# Patient Record
Sex: FEMALE | Race: WHITE | NOT HISPANIC OR LATINO | Employment: FULL TIME | URBAN - METROPOLITAN AREA
[De-identification: names, ages, dates, MRNs, and addresses within clinical notes are randomized per-mention and may not be internally consistent; named-entity substitution may affect disease eponyms.]

---

## 2024-02-20 LAB
HBV SURFACE AG SERPL QL IA: NEGATIVE
HCV AB SERPL QL IA: NEGATIVE
HIV 1+2 AB+HIV1 P24 AG SERPL QL IA: NEGATIVE
RPR: NON REACTIVE
RUBELLA AB, IGG (OLG): 1.75 IU/ML
RUBELLA IMMUNE STATUS: NORMAL

## 2024-04-23 DIAGNOSIS — O30.042 DICHORIONIC DIAMNIOTIC TWIN PREGNANCY IN SECOND TRIMESTER: Primary | ICD-10-CM

## 2024-05-02 ENCOUNTER — PROCEDURE VISIT (OUTPATIENT)
Dept: MATERNAL FETAL MEDICINE | Facility: CLINIC | Age: 24
End: 2024-05-02
Payer: COMMERCIAL

## 2024-05-02 ENCOUNTER — OFFICE VISIT (OUTPATIENT)
Dept: MATERNAL FETAL MEDICINE | Facility: CLINIC | Age: 24
End: 2024-05-02
Payer: COMMERCIAL

## 2024-05-02 VITALS
BODY MASS INDEX: 26.6 KG/M2 | WEIGHT: 150.13 LBS | DIASTOLIC BLOOD PRESSURE: 75 MMHG | HEIGHT: 63 IN | HEART RATE: 103 BPM | SYSTOLIC BLOOD PRESSURE: 107 MMHG

## 2024-05-02 DIAGNOSIS — O43.192 MARGINAL INSERTION OF UMBILICAL CORD AFFECTING MANAGEMENT OF MOTHER IN SECOND TRIMESTER: ICD-10-CM

## 2024-05-02 DIAGNOSIS — O30.042 DICHORIONIC DIAMNIOTIC TWIN PREGNANCY IN SECOND TRIMESTER: ICD-10-CM

## 2024-05-02 DIAGNOSIS — O30.042 DICHORIONIC DIAMNIOTIC TWIN PREGNANCY IN SECOND TRIMESTER: Primary | ICD-10-CM

## 2024-05-02 PROCEDURE — 3078F DIAST BP <80 MM HG: CPT | Mod: CPTII,S$GLB,, | Performed by: OBSTETRICS & GYNECOLOGY

## 2024-05-02 PROCEDURE — 76817 TRANSVAGINAL US OBSTETRIC: CPT | Mod: S$GLB,,, | Performed by: OBSTETRICS & GYNECOLOGY

## 2024-05-02 PROCEDURE — 3008F BODY MASS INDEX DOCD: CPT | Mod: CPTII,S$GLB,, | Performed by: OBSTETRICS & GYNECOLOGY

## 2024-05-02 PROCEDURE — 1160F RVW MEDS BY RX/DR IN RCRD: CPT | Mod: CPTII,S$GLB,, | Performed by: OBSTETRICS & GYNECOLOGY

## 2024-05-02 PROCEDURE — 99204 OFFICE O/P NEW MOD 45 MIN: CPT | Mod: S$GLB,,, | Performed by: OBSTETRICS & GYNECOLOGY

## 2024-05-02 PROCEDURE — 76811 OB US DETAILED SNGL FETUS: CPT | Mod: S$GLB,,, | Performed by: OBSTETRICS & GYNECOLOGY

## 2024-05-02 PROCEDURE — 1159F MED LIST DOCD IN RCRD: CPT | Mod: CPTII,S$GLB,, | Performed by: OBSTETRICS & GYNECOLOGY

## 2024-05-02 PROCEDURE — 76812 OB US DETAILED ADDL FETUS: CPT | Mod: S$GLB,,, | Performed by: OBSTETRICS & GYNECOLOGY

## 2024-05-02 PROCEDURE — 3074F SYST BP LT 130 MM HG: CPT | Mod: CPTII,S$GLB,, | Performed by: OBSTETRICS & GYNECOLOGY

## 2024-05-02 RX ORDER — ASPIRIN 81 MG/1
81 TABLET ORAL DAILY
COMMUNITY

## 2024-05-02 RX ORDER — CETIRIZINE HYDROCHLORIDE 10 MG/1
10 TABLET ORAL DAILY
COMMUNITY

## 2024-05-02 RX ORDER — DOCONEXENT, NIACINAMIDE, .ALPHA.-TOCOPHEROL ACETATE, DL-, CHOLECALCIFEROL, BETA CAROTENE, ASCORBIC ACID, THIAMINE MONONITRATE, RIBOFLAVIN, PYRIDOXINE HYDROCHLORIDE, CYANOCOBALAMIN, IRON, ZINC OXIDE, CUPRIC OXIDE, POTASSIUM IODIDE, MAGNESIUM OXIDE, FOLIC ACID, AND LEVOMEFOLATE CALCIUM 200; 15; 20; 1000; 1100; 60; 1.6; 1.8; 2.5; 25; 90; 25; 2; 150; 20; 1; .6 MG/1; MG/1; [IU]/1; [IU]/1; [IU]/1; MG/1; MG/1; MG/1; MG/1; UG/1; MG/1; MG/1; MG/1; UG/1; MG/1; MG/1; MG/1
1 CAPSULE, LIQUID FILLED ORAL DAILY
COMMUNITY
Start: 2024-04-24

## 2024-05-03 PROBLEM — O30.042 DICHORIONIC DIAMNIOTIC TWIN PREGNANCY IN SECOND TRIMESTER: Status: ACTIVE | Noted: 2024-05-03

## 2024-05-03 PROBLEM — O43.192 MARGINAL INSERTION OF UMBILICAL CORD AFFECTING MANAGEMENT OF MOTHER IN SECOND TRIMESTER: Status: ACTIVE | Noted: 2024-05-03

## 2024-05-03 NOTE — ASSESSMENT & PLAN NOTE
Today I discussed with the patient the finding of a dichorionic-diamniotic twin pregnancy and the risks associated with this type of pregnancy. I counseled her on the increased incidence of preeclampsia/gestational hypertension, gestational diabetes, fetal growth restriction, anemia, congenital anomalies, need for antepartum hospitalization,  labor/PPROM, stillbirth, and risk of postpartum hemorrhage that can occur in twin pregnancies. We discussed plans for monthly ultrasound surveillance looking for signs of fetal growth restriction.     Ultrasound reassuring, see report above. Concordant growth.    Recommendations:   Transvaginal cervical length screening at time of targeted anatomy ultrasound w/ MFM (completed)  Detailed anatomy surveys at 19-20 weeks (started, some suboptimal views)  Fetal growth ultrasounds every 3-4 weeks starting at 23-24 weeks (ok to do with primary OB or MFM)  Low-dose aspirin daily (81 mg) beginning at 12-16 weeks to reduce the risk of preeclampsia (she is taking)  Folic acid 1 mg daily and ferrous sulfate 325 mg daily  Increase daily dietary intake by approximately 300 kcal above that for a grover pregnancy, or 600 kcal over that of a nonpregnant woman and monitor weight gain   testing with weekly NST and MVP assessment beginning at 32 weeks (this is to be ordered by primary OB provider)  Given the increased risks of a twin pregnancy, prenatal visits every 2-3 weeks from 24 - 32/34 weeks and weekly prenatal visits thereafter    Delivery timing:   Normal growth and testin 0/7 - 38 6/7 weeks gestation   Normal growth, comorbid condition: 37 0/7 - 37 6/7 weeks gestation   IUGR of one or both: 36 0/7 - 36 6/7 weeks gestation   IUGR with abnormal UA Doppler, oligohydramnios, or multiple comorbid conditions:    32 0/7 - 34 6/7 weeks gestation    Comorbid conditions include: BMI >= 40, diabetes, hypertension, and complex maternal medical conditions associated with  placental dysfunction (Lupus, renal disease, or other vascular disease).

## 2024-05-03 NOTE — ASSESSMENT & PLAN NOTE
The cord insertion for baby A is marginal, measuring <1cm away from the fetal membranes. The cord insertion does not appear to be velamentous as it remains tightly bound by remi's jelly and does not appear to extend from the fetal membranes. The risks to the pregnancy are minimally increased with the presence of a marginal cord insertion.  There is no alteration to standard care due to this finding. Growth scans q4 weeks is recommended for twin pregnancy.

## 2024-05-03 NOTE — PROGRESS NOTES
"  MATERNAL-FETAL MEDICINE   CONSULT NOTE    Provider requesting consultation: Dr. Recoi    SUBJECTIVE:     Ms. Reyes Sethi is a 24 y.o.  female with IUP at 22w6d who is seen in consultation by MFM for evaluation and management of:  Problem   Dichorionic Diamniotic Twin Pregnancy in Second Trimester   Marginal Insertion of Umbilical Cord Affecting Management of Mother in Second Trimester     Reyes was referred due to dichorionic twin pregnancy suspicious for velamentous cord insertion for baby A. She had NIPT this pregnancy that was low risk and she has girl/boy twins (chorionicity is certain).   She has no past medical issues and is feeling well.   She is taking baby ASA daily.        Medication List with Changes/Refills   Current Medications    ASPIRIN (ECOTRIN) 81 MG EC TABLET    Take 81 mg by mouth once daily.    CETIRIZINE (ZYRTEC) 10 MG TABLET    Take 10 mg by mouth once daily.    VITAFOL FE PLUS 90 MG IRON- 1 MG-200 MG CAP    Take 1 capsule by mouth once daily.       Review of patient's allergies indicates:  No Known Allergies    PMH:No past medical history on file.    PObHx:  OB History    Para Term  AB Living   1             SAB IAB Ectopic Multiple Live Births                  # Outcome Date GA Lbr Richard/2nd Weight Sex Type Anes PTL Lv   1 Current                PSH:  Past Surgical History:   Procedure Laterality Date    WISDOM TOOTH EXTRACTION         Family history:family history includes Diabetes Mellitus in her father and mother.    Social history: reports that she has never smoked. She has never used smokeless tobacco. She reports that she does not currently use alcohol. She reports that she does not use drugs.    Genetic history: The patient denies any inherited genetic diseases or birth defects in herself or her partner's personal history or family.    Objective:   /75 (BP Location: Right arm)   Pulse 103   Ht 5' 3" (1.6 m)   Wt 68.1 kg (150 lb 2.1 oz)   BMI 26.59 " kg/m²     Ultrasound performed. See viewpoint for full ultrasound report.  A viable dichorionic- diamniotic pregnancy is visualized in the breech-transverse position.   Baby A (female) has an estimated fetal weight at the 34th percentile (EFW 526g). Detailed anatomy shows no abnormalities with AA, spine suboptimal. Amniotic fluid volume is normal.    Baby B (male) has an estimated fetal weight at the 37th percentile (EFW 533g). Detailed anatomy shows no abnormalities with RVOT suboptimal. Amniotic fluid volume is normal.   Growth is concordant with difference of 1%.  Both placentas are located anteriorly. Chorionicity is certain due to discrepant fetal sex.   The cord insertion for baby A is marginal near the dividing membrane.   Transvaginal cervical length is normal, measuring 3.7cm.    Significant labs/imaging:  NIPT- 46XX/ 46XY    ASSESSMENT/PLAN:     24 y.o.  female with IUP at 22w6d     Dichorionic diamniotic twin pregnancy in second trimester  Today I discussed with the patient the finding of a dichorionic-diamniotic twin pregnancy and the risks associated with this type of pregnancy. I counseled her on the increased incidence of preeclampsia/gestational hypertension, gestational diabetes, fetal growth restriction, anemia, congenital anomalies, need for antepartum hospitalization,  labor/PPROM, stillbirth, and risk of postpartum hemorrhage that can occur in twin pregnancies. We discussed plans for monthly ultrasound surveillance looking for signs of fetal growth restriction.     Ultrasound reassuring, see report above. Concordant growth.    Recommendations:   Transvaginal cervical length screening at time of targeted anatomy ultrasound w/ MFM (completed)  Detailed anatomy surveys at 19-20 weeks (started, some suboptimal views)  Fetal growth ultrasounds every 3-4 weeks starting at 23-24 weeks (ok to do with primary OB or MFM)  Low-dose aspirin daily (81 mg) beginning at 12-16 weeks to reduce the  risk of preeclampsia (she is taking)  Folic acid 1 mg daily and ferrous sulfate 325 mg daily  Increase daily dietary intake by approximately 300 kcal above that for a grover pregnancy, or 600 kcal over that of a nonpregnant woman and monitor weight gain   testing with weekly NST and MVP assessment beginning at 32 weeks (this is to be ordered by primary OB provider)  Given the increased risks of a twin pregnancy, prenatal visits every 2-3 weeks from 24 - 32/34 weeks and weekly prenatal visits thereafter    Delivery timing:   Normal growth and testin 0/7 - 38 6/7 weeks gestation   Normal growth, comorbid condition: 37 0/7 - 37 6/7 weeks gestation   IUGR of one or both: 36 0/7 - 36 6/7 weeks gestation   IUGR with abnormal UA Doppler, oligohydramnios, or multiple comorbid conditions:    32 0/7 - 34 6/7 weeks gestation    Comorbid conditions include: BMI >= 40, diabetes, hypertension, and complex maternal medical conditions associated with placental dysfunction (Lupus, renal disease, or other vascular disease).      Marginal insertion of umbilical cord affecting management of mother in second trimester  The cord insertion for baby A is marginal, measuring <1cm away from the fetal membranes. The cord insertion does not appear to be velamentous as it remains tightly bound by remi's jelly and does not appear to extend from the fetal membranes. The risks to the pregnancy are minimally increased with the presence of a marginal cord insertion.  There is no alteration to standard care due to this finding. Growth scans q4 weeks is recommended for twin pregnancy.         FOLLOW UP:     F/u in 4 weeks for US/MFM visit    This consultation was completed with the assistance of Elizabeth Rutledge NP.      Anny Oleary  Maternal-Fetal Medicine    Electronically Signed by Anny Oleary May 3, 2024

## 2024-05-30 ENCOUNTER — PROCEDURE VISIT (OUTPATIENT)
Dept: MATERNAL FETAL MEDICINE | Facility: CLINIC | Age: 24
End: 2024-05-30
Payer: COMMERCIAL

## 2024-05-30 ENCOUNTER — OFFICE VISIT (OUTPATIENT)
Dept: MATERNAL FETAL MEDICINE | Facility: CLINIC | Age: 24
End: 2024-05-30
Payer: COMMERCIAL

## 2024-05-30 VITALS
HEIGHT: 63 IN | WEIGHT: 154.31 LBS | DIASTOLIC BLOOD PRESSURE: 65 MMHG | SYSTOLIC BLOOD PRESSURE: 98 MMHG | BODY MASS INDEX: 27.34 KG/M2 | HEART RATE: 100 BPM

## 2024-05-30 DIAGNOSIS — Z36.2 ENCOUNTER FOR FOLLOW-UP ULTRASOUND OF FETAL ANATOMY: Primary | ICD-10-CM

## 2024-05-30 DIAGNOSIS — O30.042 DICHORIONIC DIAMNIOTIC TWIN PREGNANCY IN SECOND TRIMESTER: ICD-10-CM

## 2024-05-30 DIAGNOSIS — Z36.2 ENCOUNTER FOR FOLLOW-UP ULTRASOUND OF FETAL ANATOMY: ICD-10-CM

## 2024-05-30 DIAGNOSIS — O43.192 MARGINAL INSERTION OF UMBILICAL CORD AFFECTING MANAGEMENT OF MOTHER IN SECOND TRIMESTER: ICD-10-CM

## 2024-05-30 PROCEDURE — 76816 OB US FOLLOW-UP PER FETUS: CPT | Mod: S$GLB,,, | Performed by: OBSTETRICS & GYNECOLOGY

## 2024-05-30 PROCEDURE — 3074F SYST BP LT 130 MM HG: CPT | Mod: CPTII,S$GLB,, | Performed by: OBSTETRICS & GYNECOLOGY

## 2024-05-30 PROCEDURE — 3078F DIAST BP <80 MM HG: CPT | Mod: CPTII,S$GLB,, | Performed by: OBSTETRICS & GYNECOLOGY

## 2024-05-30 PROCEDURE — 1159F MED LIST DOCD IN RCRD: CPT | Mod: CPTII,S$GLB,, | Performed by: OBSTETRICS & GYNECOLOGY

## 2024-05-30 PROCEDURE — 1160F RVW MEDS BY RX/DR IN RCRD: CPT | Mod: CPTII,S$GLB,, | Performed by: OBSTETRICS & GYNECOLOGY

## 2024-05-30 PROCEDURE — 3008F BODY MASS INDEX DOCD: CPT | Mod: CPTII,S$GLB,, | Performed by: OBSTETRICS & GYNECOLOGY

## 2024-05-30 PROCEDURE — 99213 OFFICE O/P EST LOW 20 MIN: CPT | Mod: S$GLB,,, | Performed by: OBSTETRICS & GYNECOLOGY

## 2024-05-30 NOTE — ASSESSMENT & PLAN NOTE
Previously discussed with the patient the finding of a dichorionic-diamniotic twin pregnancy and the risks associated with this type of pregnancy. I counseled her on the increased incidence of preeclampsia/gestational hypertension, gestational diabetes, fetal growth restriction, anemia, congenital anomalies, need for antepartum hospitalization,  labor/PPROM, stillbirth, and risk of postpartum hemorrhage that can occur in twin pregnancies. We discussed plans for monthly ultrasound surveillance looking for signs of fetal growth restriction.     Ultrasound reassuring. Concordant growth.    Recommendations:   Transvaginal cervical length screening at time of targeted anatomy ultrasound w/ MFM (completed)  Detailed anatomy surveys at 19-20 weeks (completed)  Fetal growth ultrasounds every 3-4 weeks starting at 23-24 weeks (ok to do with primary OB or MFM)  Low-dose aspirin daily (81 mg) beginning at 12-16 weeks to reduce the risk of preeclampsia (she is taking)  Folic acid 1 mg daily and ferrous sulfate 325 mg daily  Increase daily dietary intake by approximately 300 kcal above that for a grover pregnancy, or 600 kcal over that of a nonpregnant woman and monitor weight gain   testing with weekly NST and MVP assessment beginning at 32 weeks (this is to be ordered by primary OB provider)  Given the increased risks of a twin pregnancy, prenatal visits every 2-3 weeks from 24 - 32/34 weeks and weekly prenatal visits thereafter    Delivery timing:   Normal growth and testin 0/7 - 38 6/7 weeks gestation   Normal growth, comorbid condition: 37 0/7 - 37 6/7 weeks gestation   IUGR of one or both: 36 0/7 - 36 6/7 weeks gestation   IUGR with abnormal UA Doppler, oligohydramnios, or multiple comorbid conditions:    32 0/7 - 34 6/7 weeks gestation    Comorbid conditions include: BMI >= 40, diabetes, hypertension, and complex maternal medical conditions associated with placental dysfunction (Lupus, renal  disease, or other vascular disease).

## 2024-05-30 NOTE — ASSESSMENT & PLAN NOTE
The cord insertion for baby A is marginal, measuring <1cm away from the fetal membranes. The cord insertion does not appear to be velamentous as it remains tightly bound by remi's jelly and does not appear to extend from the fetal membranes. The risks to the pregnancy are minimally increased with the presence of a marginal cord insertion.  There is no alteration to standard care due to this finding.     5/30/24- B also appears to have marginal cord insertion near the fundus.     Growth scans q4 weeks is recommended for twin pregnancy.

## 2024-05-30 NOTE — PROGRESS NOTES
"Maternal Fetal Medicine follow up consult    SUBJECTIVE:     Reyes Sethi is a 24 y.o.  female with IUP at 26w6d who is seen in follow up consultation by MFM.  Pregnancy complications include:   Problem   Dichorionic Diamniotic Twin Pregnancy in Second Trimester   Marginal Insertion of Umbilical Cord Affecting Management of Mother in Second Trimester     Reyes presents for routine follow up appointment.  She states she's feeling great. Recently passed routine 1h glucola. Without complaints.  Denies LOF, VB, contractions. Positive fetal movement.    Previous notes reviewed.   No changes to medical, surgical, family, social, or obstetric history.    Interval history since last MFM visit: see above    Medications reviewed.    Care team members:  Dr Bahena - Primary OB     OBJECTIVE:   BP 98/65 (BP Location: Right arm, Patient Position: Sitting, BP Method: Medium (Automatic))   Pulse 100   Ht 5' 3" (1.6 m)   Wt 70 kg (154 lb 5.2 oz)   BMI 27.34 kg/m²     Ultrasound performed. See viewpoint for full ultrasound report.    A viable dichorionic- diamniotic pregnancy is visualized in the breech-transverse position.   Baby A has an estimated fetal weight at the 52nd percentile (AC 72%). No new abnormalities. Amniotic fluid volume is normal.   Baby B has an estimated fetal weight at the 56th percentile (AC 71%). No new abnormalities. Amniotic fluid volume is normal.   Growth is concordant with difference of 5%  Both placentas are located anteriorly.   Both cord insertions are marginal (A is near the intertwin membrane and B is near the fundus).    ASSESSMENT/PLAN:     24 y.o.  female with IUP at 26w6d    Dichorionic diamniotic twin pregnancy in second trimester  Previously discussed with the patient the finding of a dichorionic-diamniotic twin pregnancy and the risks associated with this type of pregnancy. I counseled her on the increased incidence of preeclampsia/gestational hypertension, gestational diabetes, " fetal growth restriction, anemia, congenital anomalies, need for antepartum hospitalization,  labor/PPROM, stillbirth, and risk of postpartum hemorrhage that can occur in twin pregnancies. We discussed plans for monthly ultrasound surveillance looking for signs of fetal growth restriction.     Ultrasound reassuring. Concordant growth.    Recommendations:   Transvaginal cervical length screening at time of targeted anatomy ultrasound w/ MFM (completed)  Detailed anatomy surveys at 19-20 weeks (completed)  Fetal growth ultrasounds every 3-4 weeks starting at 23-24 weeks (ok to do with primary OB or MFM)  Low-dose aspirin daily (81 mg) beginning at 12-16 weeks to reduce the risk of preeclampsia (she is taking)  Folic acid 1 mg daily and ferrous sulfate 325 mg daily  Increase daily dietary intake by approximately 300 kcal above that for a grover pregnancy, or 600 kcal over that of a nonpregnant woman and monitor weight gain   testing with weekly NST and MVP assessment beginning at 32 weeks (this is to be ordered by primary OB provider)  Given the increased risks of a twin pregnancy, prenatal visits every 2-3 weeks from 24 - 32/34 weeks and weekly prenatal visits thereafter    Delivery timing:   Normal growth and testin 0/7 - 38 6/7 weeks gestation   Normal growth, comorbid condition: 37 0/7 - 37 6/7 weeks gestation   IUGR of one or both: 36 0/7 - 36 6/7 weeks gestation   IUGR with abnormal UA Doppler, oligohydramnios, or multiple comorbid conditions:    32 0/7 - 34 6/7 weeks gestation    Comorbid conditions include: BMI >= 40, diabetes, hypertension, and complex maternal medical conditions associated with placental dysfunction (Lupus, renal disease, or other vascular disease).      Marginal insertion of umbilical cord affecting management of mother in second trimester  The cord insertion for baby A is marginal, measuring <1cm away from the fetal membranes. The cord insertion does not appear to be  velamentous as it remains tightly bound by remi's jelly and does not appear to extend from the fetal membranes. The risks to the pregnancy are minimally increased with the presence of a marginal cord insertion.  There is no alteration to standard care due to this finding.     5/30/24- B also appears to have marginal cord insertion near the fundus.     Growth scans q4 weeks is recommended for twin pregnancy.     [facility]    No further M visits scheduled.    Anny Oleary MD  Maternal Fetal Medicine

## 2024-07-26 ENCOUNTER — ANESTHESIA EVENT (OUTPATIENT)
Dept: OBSTETRICS AND GYNECOLOGY | Facility: HOSPITAL | Age: 24
End: 2024-07-26
Payer: COMMERCIAL

## 2024-07-29 ENCOUNTER — HOSPITAL ENCOUNTER (INPATIENT)
Facility: HOSPITAL | Age: 24
LOS: 5 days | Discharge: HOME | End: 2024-08-03
Attending: OBSTETRICS & GYNECOLOGY | Admitting: OBSTETRICS & GYNECOLOGY
Payer: COMMERCIAL

## 2024-07-29 DIAGNOSIS — O36.5930 POOR FETAL GROWTH AFFECTING MANAGEMENT OF MOTHER IN THIRD TRIMESTER: ICD-10-CM

## 2024-07-29 DIAGNOSIS — Z98.891 S/P CESAREAN SECTION: Primary | ICD-10-CM

## 2024-07-29 DIAGNOSIS — O30.003 TWIN GESTATION IN THIRD TRIMESTER: ICD-10-CM

## 2024-07-29 LAB
BASOPHILS # BLD AUTO: 0.09 X10(3)/MCL
BASOPHILS NFR BLD AUTO: 0.5 %
EOSINOPHIL # BLD AUTO: 0.09 X10(3)/MCL (ref 0–0.9)
EOSINOPHIL NFR BLD AUTO: 0.5 %
ERYTHROCYTE [DISTWIDTH] IN BLOOD BY AUTOMATED COUNT: 13.2 % (ref 11.5–17)
GROUP & RH: NORMAL
HCT VFR BLD AUTO: 32.9 % (ref 37–47)
HGB BLD-MCNC: 10.9 G/DL (ref 12–16)
IMM GRANULOCYTES # BLD AUTO: 0.81 X10(3)/MCL (ref 0–0.04)
IMM GRANULOCYTES NFR BLD AUTO: 4.9 %
INDIRECT COOMBS: NORMAL
LYMPHOCYTES # BLD AUTO: 1.15 X10(3)/MCL (ref 0.6–4.6)
LYMPHOCYTES NFR BLD AUTO: 7 %
MCH RBC QN AUTO: 28.7 PG (ref 27–31)
MCHC RBC AUTO-ENTMCNC: 33.1 G/DL (ref 33–36)
MCV RBC AUTO: 86.6 FL (ref 80–94)
MONOCYTES # BLD AUTO: 0.51 X10(3)/MCL (ref 0.1–1.3)
MONOCYTES NFR BLD AUTO: 3.1 %
NEUTROPHILS # BLD AUTO: 13.76 X10(3)/MCL (ref 2.1–9.2)
NEUTROPHILS NFR BLD AUTO: 84 %
NRBC BLD AUTO-RTO: 0 %
PLATELET # BLD AUTO: 222 X10(3)/MCL (ref 130–400)
PMV BLD AUTO: 10 FL (ref 7.4–10.4)
PRENATAL STREP B CULTURE: NEGATIVE
RBC # BLD AUTO: 3.8 X10(6)/MCL (ref 4.2–5.4)
SPECIMEN OUTDATE: NORMAL
T PALLIDUM AB SER QL: NONREACTIVE
WBC # BLD AUTO: 16.41 X10(3)/MCL (ref 4.5–11.5)

## 2024-07-29 PROCEDURE — 86900 BLOOD TYPING SEROLOGIC ABO: CPT | Performed by: OBSTETRICS & GYNECOLOGY

## 2024-07-29 PROCEDURE — 86850 RBC ANTIBODY SCREEN: CPT | Performed by: OBSTETRICS & GYNECOLOGY

## 2024-07-29 PROCEDURE — 87653 STREP B DNA AMP PROBE: CPT | Performed by: OBSTETRICS & GYNECOLOGY

## 2024-07-29 PROCEDURE — 25000003 PHARM REV CODE 250: Performed by: OBSTETRICS & GYNECOLOGY

## 2024-07-29 PROCEDURE — 86780 TREPONEMA PALLIDUM: CPT | Performed by: OBSTETRICS & GYNECOLOGY

## 2024-07-29 PROCEDURE — 11000001 HC ACUTE MED/SURG PRIVATE ROOM

## 2024-07-29 PROCEDURE — 85025 COMPLETE CBC W/AUTO DIFF WBC: CPT | Performed by: OBSTETRICS & GYNECOLOGY

## 2024-07-29 RX ORDER — PRENATAL WITH FERROUS FUM AND FOLIC ACID 3080; 920; 120; 400; 22; 1.84; 3; 20; 10; 1; 12; 200; 27; 25; 2 [IU]/1; [IU]/1; MG/1; [IU]/1; MG/1; MG/1; MG/1; MG/1; MG/1; MG/1; UG/1; MG/1; MG/1; MG/1; MG/1
1 TABLET ORAL NIGHTLY
Status: DISCONTINUED | OUTPATIENT
Start: 2024-07-29 | End: 2024-07-31

## 2024-07-29 RX ORDER — BETAMETHASONE SODIUM PHOSPHATE AND BETAMETHASONE ACETATE 3; 3 MG/ML; MG/ML
12 INJECTION, SUSPENSION INTRA-ARTICULAR; INTRALESIONAL; INTRAMUSCULAR; SOFT TISSUE ONCE
Status: COMPLETED | OUTPATIENT
Start: 2024-07-30 | End: 2024-07-30

## 2024-07-29 RX ADMIN — PRENATAL VITAMINS-IRON FUMARATE 27 MG IRON-FOLIC ACID 0.8 MG TABLET 1 TABLET: at 09:07

## 2024-07-29 NOTE — H&P
HISTORY AND PHYSICAL                                                OBSTETRICS- ANTEPARTUM          Subjective:      Reyes Sethi is a 24 y.o.  female with Di-Di twin IUP at 35w3d weeks gestation who presents to L&D with IUGR of twin A and elevated dopplers today on u/s. Pertinent medical history for this pregnancy includes IUGR twin A.  Care this pregnancy has been with [facility].  Case reviewed with [facility] and admission for course of BMZ then delivery recommended.  She reports excellent movement of both babies.  [place] contractions, LOF or VB.     PMHx: History reviewed. No pertinent past medical history.    PSHx:   Past Surgical History:   Procedure Laterality Date    WISDOM TOOTH EXTRACTION         All: Review of patient's allergies indicates:  No Known Allergies    Meds:   Medications Prior to Admission   Medication Sig Dispense Refill Last Dose    aspirin (ECOTRIN) 81 MG EC tablet Take 81 mg by mouth once daily.       cetirizine (ZYRTEC) 10 MG tablet Take 10 mg by mouth once daily.       VITAFOL FE PLUS 90 mg iron- 1 mg-200 mg Cap Take 1 capsule by mouth once daily.          SH:   Social History     Socioeconomic History    Marital status: Significant Other     Spouse name: [place]   Occupational History    Occupation: accounts payable   Tobacco Use    Smoking status: Never    Smokeless tobacco: Never   Substance and Sexual Activity    Alcohol use: Not Currently    Drug use: Never    Sexual activity: Yes     Partners: Male       FH:   Family History   Problem Relation Name Age of Onset    Diabetes Mellitus Father      Diabetes Mellitus Mother         OBHx:   OB History    Para Term  AB Living   1 0 0 0 0 0   SAB IAB Ectopic Multiple Live Births   0 0 0 0 0      # Outcome Date GA Lbr Richard/2nd Weight Sex Type Anes PTL Lv   1 Current                Objective:      /67   Pulse 110   Temp 97.9 °F (36.6 °C)   SpO2 97%   Breastfeeding No   There is no height or weight on file to  calculate BMI.    General:   alert and cooperative   HEENT:  normocephalic, atraumatic   Lungs:   clear to auscultation bilaterally   Heart:   regular rate and rhythm, S1, S2 normal   Abdomen:  gravid, non-tender   Extremities non-tender, no edema   Derm: no rashes or lesions   Psych: appropriate mood and affect   Pelvis:  adequate       FHT: Category: 2, overall reassuring                 TOCO: Contractions: irregular, every 2-10 minutes, nonpainful   Cervix:  deferred     Lab Review  GBS: pending     10.9/32.9     Assessment:     24 y.o.  at 35w3d weeks gestation with di-di twin IUP, IUGR and elevated dopplers in twin A.    Active Hospital Problems    Diagnosis  POA    *Poor fetal growth affecting management of mother in third trimester [O36.5930]  Yes    Dichorionic diamniotic twin pregnancy in second trimester [O30.042]  Yes    Marginal insertion of umbilical cord affecting management of mother in second trimester [O43.192]  Yes      Resolved Hospital Problems   No resolved problems to display.     Plan:     1. Risks, benefits, alternatives and possible complications have been discussed in detail with the patient. All questions have been answered, and Ms. Sethi has voiced understanding and agrees to the treatment plan.  2. Consents signed and in chart  3. Admit to antepartum unit  4. BMZ#1 given 10:45 on .  Plan for second dose tomorrow and delivery the following.  Scheduled CD on   at 12:00.   5. NICU consult for expectations  6. BPP and dopplers in AM for twin A  7. GBS pending  8.  Discussed possible delivery at any point for non-reassuring fetal heart tones/testing.  Continuous monitoring.  ASA held.  PNV ordered.        Aparna Recio MD  6:13 PM 2024

## 2024-07-30 PROCEDURE — 11000001 HC ACUTE MED/SURG PRIVATE ROOM

## 2024-07-30 PROCEDURE — 63600175 PHARM REV CODE 636 W HCPCS: Performed by: OBSTETRICS & GYNECOLOGY

## 2024-07-30 PROCEDURE — 25000003 PHARM REV CODE 250: Performed by: OBSTETRICS & GYNECOLOGY

## 2024-07-30 RX ADMIN — BETAMETHASONE ACETATE AND BETAMETHASONE SODIUM PHOSPHATE 12 MG: 3; 3 INJECTION, SUSPENSION INTRA-ARTICULAR; INTRALESIONAL; INTRAMUSCULAR; SOFT TISSUE at 10:07

## 2024-07-30 RX ADMIN — PRENATAL VITAMINS-IRON FUMARATE 27 MG IRON-FOLIC ACID 0.8 MG TABLET 1 TABLET: at 08:07

## 2024-07-30 NOTE — PROGRESS NOTES
HISTORY AND PHYSICAL                                                OBSTETRICS- ANTEPARTUM          Subjective:      Reyes Sethi is a 24 y.o.  female with Di-Di twin IUP at 35w4d weeks gestation who presents to L&D with IUGR of twin A and elevated dopplers yesterday, 24, on u/s. Pertinent medical history for this pregnancy includes IUGR of twin A.  Care this pregnancy has been with [facility]. Currently admitted for observation, continuous monitoring and a course of steroids until planned 1LTCS tomorrow at noon.  She reports excellent movement of both babies.  [place] contractions, LOF or VB.     PMHx: History reviewed. No pertinent past medical history.    PSHx:   Past Surgical History:   Procedure Laterality Date    WISDOM TOOTH EXTRACTION         All: Review of patient's allergies indicates:  No Known Allergies    Meds:   Medications Prior to Admission   Medication Sig Dispense Refill Last Dose    aspirin (ECOTRIN) 81 MG EC tablet Take 81 mg by mouth once daily.       cetirizine (ZYRTEC) 10 MG tablet Take 10 mg by mouth once daily.       VITAFOL FE PLUS 90 mg iron- 1 mg-200 mg Cap Take 1 capsule by mouth once daily.          SH:   Social History     Socioeconomic History    Marital status: Significant Other     Spouse name: [place]   Occupational History    Occupation: accounts payable   Tobacco Use    Smoking status: Never    Smokeless tobacco: Never   Substance and Sexual Activity    Alcohol use: Not Currently    Drug use: Never    Sexual activity: Yes     Partners: Male       FH:   Family History   Problem Relation Name Age of Onset    Diabetes Mellitus Father      Diabetes Mellitus Mother         OBHx:   OB History    Para Term  AB Living   1 0 0 0 0 0   SAB IAB Ectopic Multiple Live Births   0 0 0 0 0      # Outcome Date GA Lbr Richard/2nd Weight Sex Type Anes PTL Lv   1 Current                Objective:      /68   Pulse 96   Temp 97.8 °F (36.6 °C)   Resp 18   SpO2 97%    Breastfeeding No   There is no height or weight on file to calculate BMI.    General:   alert and cooperative   HEENT:  normocephalic, atraumatic   Lungs:   clear to auscultation bilaterally   Heart:   regular rate and rhythm, S1, S2 normal   Abdomen:  gravid, non-tender   Extremities non-tender, no edema   Derm: no rashes or lesions   Psych: appropriate mood and affect   Pelvis:  adequate       FHT: Category: 2, overall reassuring                 TOCO: Contractions: irregular   Cervix:  deferred     Lab Review  GBS: pending since 24       Assessment:     24 y.o.  at 35w4d weeks gestation with di-di twin IUP, IUGR and elevated dopplers in twin A.    Active Hospital Problems    Diagnosis  POA    *Poor fetal growth affecting management of mother in third trimester [O36.5930]  Yes    Dichorionic diamniotic twin pregnancy in second trimester [O30.042]  Yes    Marginal insertion of umbilical cord affecting management of mother in second trimester [O43.192]  Yes      Resolved Hospital Problems   No resolved problems to display.     Plan:     1. BMZ#2 due 10:45 on . Scheduled CD on   at 12:00.   2. BPP and dopplers completed for twin A this morning--BPP 8/8 x2 with appropriate MARISELA and UADs.  3. GBS pending  4. Continuous monitoring.         JUDE GARCIA NP   2024

## 2024-07-31 LAB — STREP B PCR (OHS): NOT DETECTED

## 2024-07-31 PROCEDURE — 36004725 HC OB OR TIME LEV III - EA ADD 15 MIN: Performed by: OBSTETRICS & GYNECOLOGY

## 2024-07-31 PROCEDURE — 25000003 PHARM REV CODE 250: Performed by: OBSTETRICS & GYNECOLOGY

## 2024-07-31 PROCEDURE — 27201423 OPTIME MED/SURG SUP & DEVICES STERILE SUPPLY: Performed by: OBSTETRICS & GYNECOLOGY

## 2024-07-31 PROCEDURE — 63600175 PHARM REV CODE 636 W HCPCS: Performed by: OBSTETRICS & GYNECOLOGY

## 2024-07-31 PROCEDURE — 99900059 HC C-SECTION ATTEND (STAT)

## 2024-07-31 PROCEDURE — 37000008 HC ANESTHESIA 1ST 15 MINUTES: Performed by: OBSTETRICS & GYNECOLOGY

## 2024-07-31 PROCEDURE — 37000009 HC ANESTHESIA EA ADD 15 MINS: Performed by: OBSTETRICS & GYNECOLOGY

## 2024-07-31 PROCEDURE — 25000003 PHARM REV CODE 250: Performed by: NURSE ANESTHETIST, CERTIFIED REGISTERED

## 2024-07-31 PROCEDURE — 63600175 PHARM REV CODE 636 W HCPCS: Performed by: ANESTHESIOLOGY

## 2024-07-31 PROCEDURE — 63600175 PHARM REV CODE 636 W HCPCS: Performed by: NURSE ANESTHETIST, CERTIFIED REGISTERED

## 2024-07-31 PROCEDURE — 11000001 HC ACUTE MED/SURG PRIVATE ROOM

## 2024-07-31 PROCEDURE — 71000033 HC RECOVERY, INTIAL HOUR: Performed by: OBSTETRICS & GYNECOLOGY

## 2024-07-31 PROCEDURE — 63600175 PHARM REV CODE 636 W HCPCS: Mod: JZ,JG | Performed by: ANESTHESIOLOGY

## 2024-07-31 PROCEDURE — 51702 INSERT TEMP BLADDER CATH: CPT

## 2024-07-31 PROCEDURE — 27000492 HC SLEEVE, SCD T/L

## 2024-07-31 PROCEDURE — 25000003 PHARM REV CODE 250: Performed by: ANESTHESIOLOGY

## 2024-07-31 PROCEDURE — 99900035 HC TECH TIME PER 15 MIN (STAT)

## 2024-07-31 PROCEDURE — 36004724 HC OB OR TIME LEV III - 1ST 15 MIN: Performed by: OBSTETRICS & GYNECOLOGY

## 2024-07-31 RX ORDER — OXYCODONE AND ACETAMINOPHEN 5; 325 MG/1; MG/1
1 TABLET ORAL EVERY 4 HOURS PRN
Status: DISCONTINUED | OUTPATIENT
Start: 2024-07-31 | End: 2024-08-03 | Stop reason: HOSPADM

## 2024-07-31 RX ORDER — FAMOTIDINE 10 MG/ML
20 INJECTION INTRAVENOUS
Status: DISCONTINUED | OUTPATIENT
Start: 2024-07-31 | End: 2024-08-03

## 2024-07-31 RX ORDER — FENTANYL CITRATE 50 UG/ML
INJECTION, SOLUTION INTRAMUSCULAR; INTRAVENOUS
Status: DISCONTINUED | OUTPATIENT
Start: 2024-07-31 | End: 2024-07-31

## 2024-07-31 RX ORDER — METHYLERGONOVINE MALEATE 0.2 MG/ML
200 INJECTION INTRAVENOUS
Status: DISCONTINUED | OUTPATIENT
Start: 2024-07-31 | End: 2024-08-03

## 2024-07-31 RX ORDER — ONDANSETRON 4 MG/1
8 TABLET, ORALLY DISINTEGRATING ORAL EVERY 8 HOURS PRN
Status: DISCONTINUED | OUTPATIENT
Start: 2024-07-31 | End: 2024-08-03 | Stop reason: HOSPADM

## 2024-07-31 RX ORDER — KETOROLAC TROMETHAMINE 30 MG/ML
INJECTION, SOLUTION INTRAMUSCULAR; INTRAVENOUS
Status: DISCONTINUED | OUTPATIENT
Start: 2024-07-31 | End: 2024-07-31

## 2024-07-31 RX ORDER — PRENATAL WITH FERROUS FUM AND FOLIC ACID 3080; 920; 120; 400; 22; 1.84; 3; 20; 10; 1; 12; 200; 27; 25; 2 [IU]/1; [IU]/1; MG/1; [IU]/1; MG/1; MG/1; MG/1; MG/1; MG/1; MG/1; UG/1; MG/1; MG/1; MG/1; MG/1
1 TABLET ORAL DAILY
Status: DISCONTINUED | OUTPATIENT
Start: 2024-07-31 | End: 2024-08-03 | Stop reason: HOSPADM

## 2024-07-31 RX ORDER — IBUPROFEN 800 MG/1
800 TABLET ORAL EVERY 8 HOURS
Status: DISCONTINUED | OUTPATIENT
Start: 2024-08-01 | End: 2024-08-03 | Stop reason: HOSPADM

## 2024-07-31 RX ORDER — MISOPROSTOL 100 UG/1
800 TABLET ORAL ONCE AS NEEDED
Status: DISCONTINUED | OUTPATIENT
Start: 2024-07-31 | End: 2024-08-03 | Stop reason: HOSPADM

## 2024-07-31 RX ORDER — MORPHINE SULFATE 0.5 MG/ML
INJECTION, SOLUTION EPIDURAL; INTRATHECAL; INTRAVENOUS
Status: DISCONTINUED | OUTPATIENT
Start: 2024-07-31 | End: 2024-07-31

## 2024-07-31 RX ORDER — BISACODYL 10 MG/1
10 SUPPOSITORY RECTAL ONCE AS NEEDED
Status: DISCONTINUED | OUTPATIENT
Start: 2024-07-31 | End: 2024-08-03 | Stop reason: HOSPADM

## 2024-07-31 RX ORDER — OXYTOCIN-SODIUM CHLORIDE 0.9% IV SOLN 30 UNIT/500ML 30-0.9/5 UT/ML-%
95 SOLUTION INTRAVENOUS ONCE
Status: DISCONTINUED | OUTPATIENT
Start: 2024-07-31 | End: 2024-08-03 | Stop reason: HOSPADM

## 2024-07-31 RX ORDER — ACETAMINOPHEN 10 MG/ML
INJECTION, SOLUTION INTRAVENOUS
Status: DISCONTINUED | OUTPATIENT
Start: 2024-07-31 | End: 2024-07-31

## 2024-07-31 RX ORDER — MUPIROCIN 20 MG/G
OINTMENT TOPICAL 2 TIMES DAILY
Status: DISCONTINUED | OUTPATIENT
Start: 2024-07-31 | End: 2024-08-03 | Stop reason: HOSPADM

## 2024-07-31 RX ORDER — SODIUM CHLORIDE 0.9 % (FLUSH) 0.9 %
10 SYRINGE (ML) INJECTION
Status: DISCONTINUED | OUTPATIENT
Start: 2024-07-31 | End: 2024-08-03 | Stop reason: HOSPADM

## 2024-07-31 RX ORDER — MUPIROCIN 20 MG/G
OINTMENT TOPICAL
Status: DISCONTINUED | OUTPATIENT
Start: 2024-07-31 | End: 2024-08-03

## 2024-07-31 RX ORDER — CEFAZOLIN SODIUM 2 G/50ML
2 SOLUTION INTRAVENOUS
Status: COMPLETED | OUTPATIENT
Start: 2024-07-31 | End: 2024-07-31

## 2024-07-31 RX ORDER — CARBOPROST TROMETHAMINE 250 UG/ML
250 INJECTION, SOLUTION INTRAMUSCULAR
Status: DISCONTINUED | OUTPATIENT
Start: 2024-07-31 | End: 2024-08-03

## 2024-07-31 RX ORDER — EPHEDRINE SULFATE 50 MG/ML
10 INJECTION, SOLUTION INTRAVENOUS
Status: ACTIVE | OUTPATIENT
Start: 2024-07-31 | End: 2024-07-31

## 2024-07-31 RX ORDER — SODIUM CITRATE AND CITRIC ACID MONOHYDRATE 334; 500 MG/5ML; MG/5ML
30 SOLUTION ORAL ONCE
Status: COMPLETED | OUTPATIENT
Start: 2024-07-31 | End: 2024-07-31

## 2024-07-31 RX ORDER — OXYTOCIN-SODIUM CHLORIDE 0.9% IV SOLN 30 UNIT/500ML 30-0.9/5 UT/ML-%
10 SOLUTION INTRAVENOUS ONCE AS NEEDED
Status: DISCONTINUED | OUTPATIENT
Start: 2024-07-31 | End: 2024-08-03 | Stop reason: HOSPADM

## 2024-07-31 RX ORDER — KETOROLAC TROMETHAMINE 30 MG/ML
30 INJECTION, SOLUTION INTRAMUSCULAR; INTRAVENOUS EVERY 8 HOURS
Status: DISPENSED | OUTPATIENT
Start: 2024-07-31 | End: 2024-08-01

## 2024-07-31 RX ORDER — DOCUSATE SODIUM 100 MG/1
200 CAPSULE, LIQUID FILLED ORAL 2 TIMES DAILY
Status: DISCONTINUED | OUTPATIENT
Start: 2024-07-31 | End: 2024-08-03 | Stop reason: HOSPADM

## 2024-07-31 RX ORDER — OXYTOCIN 10 [USP'U]/ML
INJECTION, SOLUTION INTRAMUSCULAR; INTRAVENOUS
Status: DISCONTINUED | OUTPATIENT
Start: 2024-07-31 | End: 2024-07-31

## 2024-07-31 RX ORDER — DIPHENHYDRAMINE HCL 25 MG
25 CAPSULE ORAL EVERY 4 HOURS PRN
Status: DISCONTINUED | OUTPATIENT
Start: 2024-07-31 | End: 2024-08-03 | Stop reason: HOSPADM

## 2024-07-31 RX ORDER — MISOPROSTOL 100 UG/1
800 TABLET ORAL ONCE AS NEEDED
Status: COMPLETED | OUTPATIENT
Start: 2024-07-31 | End: 2024-07-31

## 2024-07-31 RX ORDER — OXYCODONE AND ACETAMINOPHEN 10; 325 MG/1; MG/1
1 TABLET ORAL EVERY 4 HOURS PRN
Status: DISCONTINUED | OUTPATIENT
Start: 2024-07-31 | End: 2024-08-03 | Stop reason: HOSPADM

## 2024-07-31 RX ORDER — SODIUM CITRATE AND CITRIC ACID MONOHYDRATE 334; 500 MG/5ML; MG/5ML
30 SOLUTION ORAL
Status: DISCONTINUED | OUTPATIENT
Start: 2024-07-31 | End: 2024-08-03

## 2024-07-31 RX ORDER — AMOXICILLIN 250 MG
1 CAPSULE ORAL NIGHTLY PRN
Status: DISCONTINUED | OUTPATIENT
Start: 2024-07-31 | End: 2024-08-03 | Stop reason: HOSPADM

## 2024-07-31 RX ORDER — SODIUM CHLORIDE, SODIUM LACTATE, POTASSIUM CHLORIDE, CALCIUM CHLORIDE 600; 310; 30; 20 MG/100ML; MG/100ML; MG/100ML; MG/100ML
INJECTION, SOLUTION INTRAVENOUS CONTINUOUS
Status: DISCONTINUED | OUTPATIENT
Start: 2024-07-31 | End: 2024-08-03

## 2024-07-31 RX ORDER — OXYTOCIN 10 [USP'U]/ML
10 INJECTION, SOLUTION INTRAMUSCULAR; INTRAVENOUS ONCE AS NEEDED
Status: DISCONTINUED | OUTPATIENT
Start: 2024-07-31 | End: 2024-08-03 | Stop reason: HOSPADM

## 2024-07-31 RX ORDER — EPHEDRINE SULFATE 50 MG/ML
INJECTION, SOLUTION INTRAVENOUS
Status: DISCONTINUED | OUTPATIENT
Start: 2024-07-31 | End: 2024-07-31

## 2024-07-31 RX ORDER — BUPIVACAINE HYDROCHLORIDE 7.5 MG/ML
INJECTION, SOLUTION EPIDURAL; RETROBULBAR
Status: COMPLETED | OUTPATIENT
Start: 2024-07-31 | End: 2024-07-31

## 2024-07-31 RX ORDER — MORPHINE SULFATE 4 MG/ML
4 INJECTION, SOLUTION INTRAMUSCULAR; INTRAVENOUS
Status: DISCONTINUED | OUTPATIENT
Start: 2024-07-31 | End: 2024-08-03 | Stop reason: HOSPADM

## 2024-07-31 RX ORDER — OXYTOCIN-SODIUM CHLORIDE 0.9% IV SOLN 30 UNIT/500ML 30-0.9/5 UT/ML-%
10 SOLUTION INTRAVENOUS ONCE
Status: DISCONTINUED | OUTPATIENT
Start: 2024-07-31 | End: 2024-08-03

## 2024-07-31 RX ORDER — METHYLERGONOVINE MALEATE 0.2 MG/ML
200 INJECTION INTRAVENOUS ONCE AS NEEDED
Status: DISCONTINUED | OUTPATIENT
Start: 2024-07-31 | End: 2024-08-03 | Stop reason: HOSPADM

## 2024-07-31 RX ORDER — ADHESIVE BANDAGE
30 BANDAGE TOPICAL 2 TIMES DAILY PRN
Status: DISCONTINUED | OUTPATIENT
Start: 2024-08-01 | End: 2024-08-03 | Stop reason: HOSPADM

## 2024-07-31 RX ORDER — FENTANYL/BUPIVACAINE/NS/PF 2-1250MCG
PLASTIC BAG, INJECTION (ML) INJECTION CONTINUOUS
Status: DISCONTINUED | OUTPATIENT
Start: 2024-07-31 | End: 2024-08-03

## 2024-07-31 RX ORDER — CARBOPROST TROMETHAMINE 250 UG/ML
250 INJECTION, SOLUTION INTRAMUSCULAR
Status: DISCONTINUED | OUTPATIENT
Start: 2024-07-31 | End: 2024-08-03 | Stop reason: HOSPADM

## 2024-07-31 RX ORDER — DIPHENOXYLATE HYDROCHLORIDE AND ATROPINE SULFATE 2.5; .025 MG/1; MG/1
2 TABLET ORAL EVERY 6 HOURS PRN
Status: DISCONTINUED | OUTPATIENT
Start: 2024-07-31 | End: 2024-08-03 | Stop reason: HOSPADM

## 2024-07-31 RX ORDER — SIMETHICONE 80 MG
1 TABLET,CHEWABLE ORAL EVERY 6 HOURS PRN
Status: DISCONTINUED | OUTPATIENT
Start: 2024-07-31 | End: 2024-08-03 | Stop reason: HOSPADM

## 2024-07-31 RX ORDER — OXYTOCIN-SODIUM CHLORIDE 0.9% IV SOLN 30 UNIT/500ML 30-0.9/5 UT/ML-%
95 SOLUTION INTRAVENOUS ONCE AS NEEDED
Status: DISCONTINUED | OUTPATIENT
Start: 2024-07-31 | End: 2024-08-03 | Stop reason: HOSPADM

## 2024-07-31 RX ORDER — MISOPROSTOL 100 UG/1
800 TABLET ORAL
Status: DISCONTINUED | OUTPATIENT
Start: 2024-07-31 | End: 2024-08-03

## 2024-07-31 RX ADMIN — EPHEDRINE SULFATE 50 MG: 50 INJECTION INTRAVENOUS at 11:07

## 2024-07-31 RX ADMIN — SODIUM CITRATE AND CITRIC ACID MONOHYDRATE 30 ML: 500; 334 SOLUTION ORAL at 10:07

## 2024-07-31 RX ADMIN — TRANEXAMIC ACID 1000 MG: 100 INJECTION, SOLUTION INTRAVENOUS at 02:07

## 2024-07-31 RX ADMIN — KETOROLAC TROMETHAMINE 30 MG: 30 INJECTION, SOLUTION INTRAMUSCULAR at 08:07

## 2024-07-31 RX ADMIN — OXYTOCIN 30 UNITS: 10 INJECTION, SOLUTION INTRAMUSCULAR; INTRAVENOUS at 12:07

## 2024-07-31 RX ADMIN — CEFAZOLIN SODIUM 2 G: 2 SOLUTION INTRAVENOUS at 11:07

## 2024-07-31 RX ADMIN — SODIUM CHLORIDE, POTASSIUM CHLORIDE, SODIUM LACTATE AND CALCIUM CHLORIDE: 600; 310; 30; 20 INJECTION, SOLUTION INTRAVENOUS at 03:07

## 2024-07-31 RX ADMIN — SODIUM CHLORIDE, POTASSIUM CHLORIDE, SODIUM LACTATE AND CALCIUM CHLORIDE 1000 ML: 600; 310; 30; 20 INJECTION, SOLUTION INTRAVENOUS at 10:07

## 2024-07-31 RX ADMIN — TRANEXAMIC ACID 1000 MG: 100 INJECTION, SOLUTION INTRAVENOUS at 01:07

## 2024-07-31 RX ADMIN — FENTANYL CITRATE 10 MCG: 50 INJECTION, SOLUTION INTRAMUSCULAR; INTRAVENOUS at 11:07

## 2024-07-31 RX ADMIN — DOCUSATE SODIUM 200 MG: 100 CAPSULE, LIQUID FILLED ORAL at 08:07

## 2024-07-31 RX ADMIN — ACETAMINOPHEN 1000 MG: 10 INJECTION, SOLUTION INTRAVENOUS at 12:07

## 2024-07-31 RX ADMIN — MORPHINE SULFATE 0.1 MG: 0.5 INJECTION, SOLUTION EPIDURAL; INTRATHECAL; INTRAVENOUS at 11:07

## 2024-07-31 RX ADMIN — MISOPROSTOL 800 MCG: 100 TABLET ORAL at 02:07

## 2024-07-31 RX ADMIN — KETOROLAC TROMETHAMINE 30 MG: 30 INJECTION, SOLUTION INTRAMUSCULAR; INTRAVENOUS at 12:07

## 2024-07-31 RX ADMIN — OXYCODONE HYDROCHLORIDE AND ACETAMINOPHEN 1 TABLET: 10; 325 TABLET ORAL at 07:07

## 2024-07-31 RX ADMIN — SODIUM CHLORIDE, POTASSIUM CHLORIDE, SODIUM LACTATE AND CALCIUM CHLORIDE: 600; 310; 30; 20 INJECTION, SOLUTION INTRAVENOUS at 11:07

## 2024-07-31 RX ADMIN — BUPIVACAINE HYDROCHLORIDE 1.8 ML: 7.5 INJECTION, SOLUTION EPIDURAL; RETROBULBAR at 11:07

## 2024-07-31 NOTE — PROGRESS NOTES
Antepartum Progress Note        Subjective:      Patient currently doing well without complaints. She reports good fetal movement, denies contractions, VB, LOF.  S/p BMZ x 2 doses.  Planned 1CD today due to IUGR twin A and elevated dopplers.     Objective:      Temp:  [97.6 °F (36.4 °C)] 97.6 °F (36.4 °C)  Pulse:  [] 91  Resp:  [16-18] 18  SpO2:  [94 %-98 %] 97 %  BP: ()/(51-61) 104/61  Body mass index is 28.7 kg/m².     General: no acute distress  Electronic Fetal Monitoring:  FHT: Category: 1                 TOCO: Contractions: irregular, every 5-20 minutes      GBS neg  10/32     Assessment:     1. Di-Di twin IUP at 35w5d for 1CD today due to IUGR and elevated dopplers Twin A  2.   Group & Rh   Date Value Ref Range Status   07/29/2024 A POS  Final     3. Category 1 FHT  4. Breech/Transverse presentation     Plan:     1. S/p BMZ x 2  2. 1CD today.  RIBA discussed.  Ancef/SCDs. All questions answered.  3. Reassuring FHT  4. NICU aware    Aparna Recio MD  7/31/202411:19 AM

## 2024-07-31 NOTE — TRANSFER OF CARE
"Anesthesia Transfer of Care Note    Patient: Reyes Sethi    Procedure(s) Performed: Procedure(s) (LRB):   SECTION (N/A)    Patient location: PACU    Anesthesia Type: spinal    Transport from OR: Transported from OR on room air with adequate spontaneous ventilation    Post pain: adequate analgesia    Post assessment: no apparent anesthetic complications    Level of consciousness: awake and alert    Nausea/Vomiting: no nausea/vomiting    Complications: none    Transfer of care protocol was followed      Last vitals: Visit Vitals  /66 (BP Location: Left arm)   Pulse 64   Temp 36 °C (96.8 °F)   Resp 18   Ht 5' 3" (1.6 m)   Wt 73.5 kg (162 lb)   SpO2 100%   Breastfeeding No   BMI 28.70 kg/m²     "

## 2024-07-31 NOTE — ANESTHESIA PREPROCEDURE EVALUATION
2024  Reyes Sethi is a 24 y.o., female. Planned 1CD today due to IUGR twin A and elevated dopplers.       Pre-op Assessment    I have reviewed the Patient Summary Reports.     I have reviewed the Nursing Notes. I have reviewed the NPO Status.   I have reviewed the Medications.     Review of Systems  Anesthesia Hx:  No problems with previous Anesthesia                Cardiovascular:  Exercise tolerance: good                                               Physical Exam  General: Well nourished and Cooperative    Airway:  Mallampati: II   Mouth Opening: Normal  TM Distance: Normal  Tongue: Normal  Neck ROM: Normal ROM    Dental:  Intact    Chest/Lungs:  Clear to auscultation    Heart:  Rhythm: Regular Rhythm        Anesthesia Plan  Type of Anesthesia, risks & benefits discussed:    Anesthesia Type: Spinal  Intra-op Monitoring Plan: Standard ASA Monitors  Post Op Pain Control Plan: multimodal analgesia  Informed Consent: Informed consent signed with the Patient and all parties understand the risks and agree with anesthesia plan.  All questions answered.   ASA Score: 2  Day of Surgery Review of History & Physical: H&P Update referred to the surgeon/provider.    Ready For Surgery From Anesthesia Perspective.     .  I explained anesthesia plan to patient/responsbile party if available.  Anesthesia consent done going over the material facts, risks, complications & alternatives, obtained which includes the possibility of altering the anesthesia plan.  I reviewed problem list, prior to admission medication list, appropriate labs, any workup, Xray, EKG etc noted below.  Patients condition is satisfactory to proceed with anesthesia plan unless otherwise noted (see anesthesia chart for details of the anesthesia plan carried out).      Pre-operative evaluation for Procedure(s) (LRB):   SECTION (N/A)    BP  "(!) 100/55   Pulse 86   Temp 36.6 °C (97.8 °F)   Resp 16   SpO2 96%   Breastfeeding No     Patient Active Problem List   Diagnosis    Dichorionic diamniotic twin pregnancy in second trimester    Marginal insertion of umbilical cord affecting management of mother in second trimester    Poor fetal growth affecting management of mother in third trimester       Review of patient's allergies indicates:  No Known Allergies    Current Outpatient Medications   Medication Instructions    aspirin (ECOTRIN) 81 mg, Oral, Daily    cetirizine (ZYRTEC) 10 mg, Oral, Daily    VITAFOL FE PLUS 90 mg iron- 1 mg-200 mg Cap 1 capsule, Oral, Daily       Past Surgical History:   Procedure Laterality Date    WISDOM TOOTH EXTRACTION         Social History     Socioeconomic History    Marital status: Significant Other     Spouse name: [place]   Occupational History    Occupation: [employer]   Tobacco Use    Smoking status: Never    Smokeless tobacco: Never   Substance and Sexual Activity    Alcohol use: Not Currently    Drug use: Never    Sexual activity: Yes     Partners: Male       Lab Results   Component Value Date    WBC 16.41 (H) 07/29/2024    HGB 10.9 (L) 07/29/2024    HCT 32.9 (L) 07/29/2024    MCV 86.6 07/29/2024     07/29/2024          BMP  Lab Results   Component Value Date    HCT 32.9 (L) 07/29/2024        INR  No results for input(s): "PT", "INR", "PROTIME", "APTT" in the last 72 hours.        Diagnostic Studies:    .      EKG:  No results found for this or any previous visit.    Abran Gipson MD           "

## 2024-07-31 NOTE — ANESTHESIA POSTPROCEDURE EVALUATION
Anesthesia Post Evaluation    Patient: Reyes Sethi    Procedure(s) Performed: Procedure(s) (LRB):   SECTION (N/A)    Final Anesthesia Type: regional (Regional comprises either epidural or spinal)      Patient location during evaluation: labor & delivery  Patient participation: Yes- Able to Participate  Post-procedure vital signs: reviewed and stable (No complaints at this time)  Pain management: adequate      Anesthetic complications: no                No complaints at this time.      Vitals Value Taken Time   /59 24 1335   Temp 36.7 °C (98 °F) 24 1400   Pulse 57 24 1335   Resp 12 24 1335   SpO2 98 % 24 1335         Event Time   Out of Recovery 2024 13:35:00         Pain/Renetta Score: Renetta Score: 10 (2024  1:35 PM)

## 2024-07-31 NOTE — L&D DELIVERY NOTE
[facility] - Labor and Delivery   Section   Operative Note    SUMMARY     Date of Procedure: 2024     Procedure: Procedure(s) (LRB):   SECTION (N/A)    Surgeons and Role:     * Aparna Recio MD - Primary     * Clarence Eaton MD - Assisting    Pre-Operative Diagnosis: Twin gestation, dichorionic diamniotic [O30.049]  IUGR (intrauterine growth restriction) affecting care of mother, third trimester, fetus 1 [O36.5931]    Post-Operative Diagnosis: Post-Op Diagnosis Codes:     * Twin gestation, dichorionic diamniotic [O30.049]     * IUGR (intrauterine growth restriction) affecting care of mother, third trimester, fetus 1 [O36.5931]    Anesthesia: Spinal           Description of the Findings of the Procedure:   Twin A: VFI, footling breech, clear fluid  Twin B: VMI, transverse delivered footling breech, clear fluid    Complications: No    Blood Loss: QBL per RN     Reyes was brought to the OR with IV fluids running.  Spinal placed and SCDs applied. With patient in supine position, the legs are  and Serrano Catheter placed and positioning to supine done.  Ancef given.   Abdomen prepped with Chloroprep and 3 minute drying time allowed prior to draping of the abdomen.  Time out taken with OR team members.  Pfannenstiel Incision made through the skin, transverse fascial incision developed, rectus muscles  in the midline and the peritoneum entered.   no adhesions noted.  Jaya retractor placed.   The lower uterine segment and position of the fetus identified.   Bladder flap taken down through transverse peritoneal incision.    Low Transverse Incision made through well developed lower uterine segment and extended laterally with blunt dissection.   Clear fluid noted.  Infant A delivered from footlingbreech presentation.  Infant B in transverse presentation and delivered footling breech.  Cord clamped after one minute and  handed to awaiting NICU and resp team.    Cord blood taken, placenta delivered.  The uterus wasnot exteriorized.  The edges of the uterine incision are grasped with Mendoza clamps at the angles and the inferior and superior midline edges of the incision.    Closure with running lock 0 vicryl, starting at each angle, tying in the midline.   Observation for bleeding with suture of any bleeding along the hysterotomy line.   With good hemostasis noted, the anterior pelvis is rinsed with sterile saline.   Right and left adnexa with normal anatomy.  Jaya retractor removed.      Closure of the abdomen with 2 0 Vicryl running of the peritoneum, fascial closure with 0 stratafix starting at the left angle and tying the knot at the right angle.  Subcutaneous layer closed with plain gut. Skin closure with 4 0 stratafix subcuticular.  Wound dressed with telfa and abd pad.          Specimens:   Specimen (24h ago, onward)       Start     Ordered    07/31/24 1206  Specimen to Pathology Gynecology and Obstetrics  Once        References:    Click here for ordering Quick Tip   Question Answer Comment   Procedure Type: Gynecology and Obstetrics    Specimen Source Placenta    Specimen Class: Routine/Screening    Procedure Type: Slide Consult    Clinical Information: TWINS, IUP AT 35.5wks    Release to patient Immediate        07/31/24 1206                    Condition: Good    VTE Risk Mitigation (From admission, onward)           Ordered     IP VTE HIGH RISK PATIENT  Once         07/31/24 1233     Place sequential compression device  Until discontinued         07/31/24 1233     Place sequential compression device  Until discontinued         07/31/24 1022                    Disposition: PACU - hemodynamically stable.    Attestation: David Sethi, A Tobin Chambers [[id number]]   Delivery Information for A Tobin Hessaux    Birth information:  YOB: 2025   Time of birth: 12:01 PM   Sex: female   Head Delivery Date/Time: 7/31/2024 12:01 PM    Delivery type: , Low Transverse   Gestational Age: 35w5d        Delivery Providers    Delivering clinician: Aparna Recio MD   Provider Role    Caesar Sethi RN Delivery Nurse              Measurements    Weight: 2290 g  Weight (lbs): 5 lb 0.8 oz  Length:          Apgars    Living status: Living  Apgar Component Scores:  1 min.:  5 min.:  10 min.:  15 min.:  20 min.:    Skin color:         Heart rate:         Reflex irritability:         Muscle tone:         Respiratory effort:         Total:                  Operative Delivery    Forceps attempted?: No  Vacuum extractor attempted?: No         Shoulder Dystocia    Shoulder dystocia present?: No           Presentation    Presentation: Rubio Breech           Interventions/Resuscitation           Cord    Vessels: 3 vessels  Complications: None  Delayed Cord Clamping?: Yes  Cord Clamped Date/Time: 2024 12:02 PM  Cord Blood Disposition: Sent with Baby  Gases Sent?: No  Stem Cell Collection (by MD): No       Placenta    Placenta delivery date/time: 2024 1204  Placenta removal: Manual removal  Placenta appearance: Intact  Placenta disposition: Pathology           Labor Events:       labor: No     Labor Onset Date/Time:         Dilation Complete Date/Time:         Start Pushing Date/Time:       Rupture Date/Time: 24  1201         Rupture type: ARM (Artificial Rupture)         Fluid Amount:       Fluid Color: Clear       steroids: Full Course     Antibiotics given for GBS: No     Induction:       Indications for induction:        Augmentation:       Indications for augmentation:       Labor complications: None     Additional complications:          Cervical ripening:                     Delivery:      Episiotomy: None     Indication for Episiotomy:       Perineal Lacerations: None Repaired:      Periurethral Laceration:   Repaired:     Labial Laceration:   Repaired:     Sulcus Laceration:   Repaired:     Vaginal Laceration:    Repaired:     Cervical Laceration:   Repaired:     Repair suture: None     Repair # of packets:       Last Value - EBL - Nursing (mL):       Sum - EBL - Nursing (mL): 0     Last Value - EBL - Anesthesia (mL):      Calculated QBL (mL): 1005      Running total QBL (mL): 1005      Vaginal Sweep Performed: No     Surgicount Correct: Yes       Other providers:       Anesthesia    Method: Spinal          Details (if applicable):  Trial of Labor No    Categorization: Primary    Priority: Routine   Indications for : Breech;Multiple Gestation   Incision Type: low transverse     Additional  information:  Forceps:    Vacuum:    Breech:    Observed anomalies    Other (Comments):            Navdeep JYOTI Chambers [[id number]]   Delivery Information for JYOTI Sethi    Birth information:  YOB: 2025   Time of birth: 12:03 PM   Sex: male   Head Delivery Date/Time: 2024 12:03 PM   Delivery type: , Low Transverse   Gestational Age: 35w5d        Delivery Providers    Delivering clinician: Aparna Recio MD   Provider Role    Caesar Sethi RN Delivery Nurse              Measurements    Weight: 2360 g  Weight (lbs): 5 lb 3.3 oz  Length:          Apgars    Living status: Living  Apgar Component Scores:  1 min.:  5 min.:  10 min.:  15 min.:  20 min.:    Skin color:         Heart rate:         Reflex irritability:         Muscle tone:         Respiratory effort:         Total:                  Operative Delivery    Forceps attempted?: No  Vacuum extractor attempted?: No         Shoulder Dystocia    Shoulder dystocia present?: No           Presentation    Presentation: Footling Breech           Interventions/Resuscitation           Cord    Vessels: 3 vessels  Complications: None  Cord Clamped Date/Time: 2024 12:04 PM  Cord Blood Disposition: Sent with Baby  Gases Sent?: No  Stem Cell Collection (by MD): No       Placenta    Placenta delivery date/time:  2024 1204  Placenta removal: Manual removal  Placenta appearance: Intact  Placenta disposition: Pathology           Labor Events:       labor: No     Labor Onset Date/Time:         Dilation Complete Date/Time:         Start Pushing Date/Time:       Rupture Date/Time: 24  1201         Rupture type: ARM (Artificial Rupture)         Fluid Amount:       Fluid Color: Clear       steroids: Full Course     Antibiotics given for GBS: No     Induction:       Indications for induction:        Augmentation:       Indications for augmentation:       Labor complications: None     Additional complications:          Cervical ripening:                     Delivery:      Episiotomy: None     Indication for Episiotomy:       Perineal Lacerations: None Repaired:      Periurethral Laceration:   Repaired:     Labial Laceration:   Repaired:     Sulcus Laceration:   Repaired:     Vaginal Laceration:   Repaired:     Cervical Laceration:   Repaired:     Repair suture: None     Repair # of packets:       Last Value - EBL - Nursing (mL):       Sum - EBL - Nursing (mL): 0     Last Value - EBL - Anesthesia (mL):      Calculated QBL (mL): 1005      Running total QBL (mL): 1005      Vaginal Sweep Performed: No     Surgicount Correct: Yes       Other providers:       Anesthesia    Method: Spinal          Details (if applicable):  Trial of Labor No    Categorization: Primary    Priority: Routine   Indications for : Breech;Multiple Gestation   Incision Type: low transverse     Additional  information:  Forceps:    Vacuum:    Breech:    Observed anomalies    Other (Comments):

## 2024-07-31 NOTE — ANESTHESIA PROCEDURE NOTES
Spinal    Diagnosis: for surgery  Patient location during procedure: OR  Start time: 7/31/2024 11:45 AM  Timeout: 7/31/2024 11:45 AM  End time: 7/31/2024 11:50 AM    Staffing  Authorizing Provider: Abran Gipson MD  Performing Provider: Abran Gipson MD    Staffing  Performed by: Abran Gipson MD  Authorized by: Abran Gipson MD    Preanesthetic Checklist  Completed: patient identified, IV checked, site marked, risks and benefits discussed, surgical consent, monitors and equipment checked, pre-op evaluation and timeout performed  Spinal Block  Patient position: sitting  Prep: ChloraPrep and Mask worn, hand hygeine performed, sterile gloves worn  Patient monitoring: heart rate, continuous pulse ox and frequent blood pressure checks  Approach: midline  Location: L3-4  Injection technique: single shot  CSF Fluid: clear free-flowing CSF  Needle  Needle type: pencil-tip (20G 1.25 inch introducer needle used to facilitate passage of spinal needle)   Needle gauge: 25 G  Needle length: 3.5 in  Additional Documentation: negative aspiration for heme, no paresthesia on injection and incremental injection  Needle localization: anatomical landmarks  Assessment  Sensory level: T4   Dermatomal levels determined by alcohol wipe  Ease of block: easy  Patient's tolerance of the procedure: comfortable throughout block (Sterile dressing applied to skin puncture site, pt returned to supine position)  Additional Notes  GLORIA applied as needed.  Fentanyl 10 mcg & Duramorph 150 mcg added to spinal.  Medications:    Medications: bupivacaine (pf) (MARCAINE) injection 0.75% - Intraspinal   1.8 mL - 7/31/2024 11:49:00 AM

## 2024-08-01 ENCOUNTER — ANESTHESIA EVENT (OUTPATIENT)
Dept: OBSTETRICS AND GYNECOLOGY | Facility: HOSPITAL | Age: 24
End: 2024-08-01
Payer: COMMERCIAL

## 2024-08-01 ENCOUNTER — ANESTHESIA (OUTPATIENT)
Dept: OBSTETRICS AND GYNECOLOGY | Facility: HOSPITAL | Age: 24
End: 2024-08-01
Payer: COMMERCIAL

## 2024-08-01 LAB
ABS NEUT (OLG): 20.39 X10(3)/MCL (ref 2.1–9.2)
ANISOCYTOSIS BLD QL SMEAR: ABNORMAL
ERYTHROCYTE [DISTWIDTH] IN BLOOD BY AUTOMATED COUNT: 13.3 % (ref 11.5–17)
HCT VFR BLD AUTO: 24.2 % (ref 37–47)
HGB BLD-MCNC: 7.9 G/DL (ref 12–16)
INSTRUMENT WBC (OLG): 24.57 X10(3)/MCL
LYMPHOCYTES NFR BLD MANUAL: 1.23 X10(3)/MCL
LYMPHOCYTES NFR BLD MANUAL: 5 %
MCH RBC QN AUTO: 28.6 PG (ref 27–31)
MCHC RBC AUTO-ENTMCNC: 32.6 G/DL (ref 33–36)
MCV RBC AUTO: 87.7 FL (ref 80–94)
METAMYELOCYTES NFR BLD MANUAL: 1 %
MICROCYTES BLD QL SMEAR: ABNORMAL
MONOCYTES NFR BLD MANUAL: 1.72 X10(3)/MCL (ref 0.1–1.3)
MONOCYTES NFR BLD MANUAL: 7 %
MYELOCYTES NFR BLD MANUAL: 2 %
NEUTROPHILS NFR BLD MANUAL: 83 %
NRBC BLD AUTO-RTO: 0 %
PLATELET # BLD AUTO: 251 X10(3)/MCL (ref 130–400)
PLATELET # BLD EST: NORMAL 10*3/UL
PLATELETS.RETICULATED NFR BLD AUTO: 3.7 % (ref 0.9–11.2)
PMV BLD AUTO: 10.5 FL (ref 7.4–10.4)
POIKILOCYTOSIS BLD QL SMEAR: ABNORMAL
PROMYELOCYTES # BLD MANUAL: 2 %
RBC # BLD AUTO: 2.76 X10(6)/MCL (ref 4.2–5.4)
RBC MORPH BLD: ABNORMAL
STOMATOCYTES (OLG): ABNORMAL
WBC # BLD AUTO: 24.57 X10(3)/MCL (ref 4.5–11.5)

## 2024-08-01 PROCEDURE — 25000003 PHARM REV CODE 250: Performed by: NURSE PRACTITIONER

## 2024-08-01 PROCEDURE — 36415 COLL VENOUS BLD VENIPUNCTURE: CPT | Performed by: OBSTETRICS & GYNECOLOGY

## 2024-08-01 PROCEDURE — 63600175 PHARM REV CODE 636 W HCPCS: Performed by: OBSTETRICS & GYNECOLOGY

## 2024-08-01 PROCEDURE — 11000001 HC ACUTE MED/SURG PRIVATE ROOM

## 2024-08-01 PROCEDURE — 85007 BL SMEAR W/DIFF WBC COUNT: CPT | Performed by: OBSTETRICS & GYNECOLOGY

## 2024-08-01 PROCEDURE — 25000003 PHARM REV CODE 250: Performed by: OBSTETRICS & GYNECOLOGY

## 2024-08-01 RX ORDER — ACETAMINOPHEN 325 MG/1
650 TABLET ORAL EVERY 6 HOURS PRN
Status: DISCONTINUED | OUTPATIENT
Start: 2024-08-01 | End: 2024-08-03 | Stop reason: HOSPADM

## 2024-08-01 RX ADMIN — PRENATAL VITAMINS-IRON FUMARATE 27 MG IRON-FOLIC ACID 0.8 MG TABLET 1 TABLET: at 09:08

## 2024-08-01 RX ADMIN — OXYCODONE HYDROCHLORIDE AND ACETAMINOPHEN 1 TABLET: 10; 325 TABLET ORAL at 07:08

## 2024-08-01 RX ADMIN — KETOROLAC TROMETHAMINE 30 MG: 30 INJECTION, SOLUTION INTRAMUSCULAR at 04:08

## 2024-08-01 RX ADMIN — OXYCODONE HYDROCHLORIDE AND ACETAMINOPHEN 1 TABLET: 10; 325 TABLET ORAL at 04:08

## 2024-08-01 RX ADMIN — DOCUSATE SODIUM 200 MG: 100 CAPSULE, LIQUID FILLED ORAL at 08:08

## 2024-08-01 RX ADMIN — ACETAMINOPHEN 650 MG: 325 TABLET, FILM COATED ORAL at 08:08

## 2024-08-01 RX ADMIN — DOCUSATE SODIUM 200 MG: 100 CAPSULE, LIQUID FILLED ORAL at 09:08

## 2024-08-01 RX ADMIN — IBUPROFEN 800 MG: 800 TABLET, FILM COATED ORAL at 01:08

## 2024-08-01 RX ADMIN — IBUPROFEN 800 MG: 800 TABLET, FILM COATED ORAL at 09:08

## 2024-08-01 NOTE — PROGRESS NOTES
PostPartum Progress Note        Subjective:      Post-Operative Day #1 after  delivery secondary to johnnie twin pregnancy with IUGR of baby A and malposition of both babies .    Patient is without complaints. Lochia decreasing. Breast feeding. Pain is well controlled. Patient is ambulating. Tolerating Full Regular diet.Overall mother and babies are doing well.     Objective:      Temp:  [96.8 °F (36 °C)-98.6 °F (37 °C)] 97.5 °F (36.4 °C)  Pulse:  [] 76  Resp:  [12-25] 18  SpO2:  [95 %-100 %] 98 %  BP: ()/(47-78) 96/61    Intake/Output Summary (Last 24 hours) at 2024 0832  Last data filed at 2024 2200  Gross per 24 hour   Intake 1150 ml   Output 2455 ml   Net -1305 ml     Body mass index is 28.7 kg/m².    General: no acute distress  Abdomen: soft, non-tender, non-distended; Fundus firm and at the umbilicus  Incision- intact, healing well, no sign of infection  Extremities: non-tender, symmetric, trace edema    Group & Rh   Date Value Ref Range Status   2024 A POS  Final     Recent Results (from the past 336 hour(s))   Manual Differential    Collection Time: 24  4:18 AM   Result Value Ref Range    WBC 24.57 x10(3)/mcL   CBC with Differential    Collection Time: 24  4:18 AM   Result Value Ref Range    WBC 24.57 (H) 4.50 - 11.50 x10(3)/mcL    Hgb 7.9 (L) 12.0 - 16.0 g/dL    Hct 24.2 (L) 37.0 - 47.0 %    Platelet 251 130 - 400 x10(3)/mcL   CBC with Differential    Collection Time: 24  1:44 PM   Result Value Ref Range    WBC 16.41 (H) 4.50 - 11.50 x10(3)/mcL    Hgb 10.9 (L) 12.0 - 16.0 g/dL    Hct 32.9 (L) 37.0 - 47.0 %    Platelet 222 130 - 400 x10(3)/mcL          Assessment:     24 y.o.  S/P  Delivery Post-Operative Day #1  - Doing Well      Plan:     1. Continue routine postpartum care  2. Plan for D/C  in 2-3 days

## 2024-08-01 NOTE — PLAN OF CARE
Problem: Adult Inpatient Plan of Care  Goal: Plan of Care Review  Outcome: Progressing  Goal: Patient-Specific Goal (Individualized)  Outcome: Progressing  Goal: Absence of Hospital-Acquired Illness or Injury  Outcome: Progressing  Goal: Optimal Comfort and Wellbeing  Outcome: Progressing  Goal: Readiness for Transition of Care  Outcome: Progressing     Problem:  Fall Injury Risk  Goal: Absence of Fall, Infant Drop and Related Injury  Outcome: Progressing     Problem: Wound  Goal: Optimal Coping  Outcome: Progressing  Goal: Optimal Functional Ability  Outcome: Progressing  Goal: Absence of Infection Signs and Symptoms  Outcome: Progressing  Goal: Improved Oral Intake  Outcome: Progressing  Goal: Optimal Pain Control and Function  Outcome: Progressing  Goal: Skin Health and Integrity  Outcome: Progressing  Goal: Optimal Wound Healing  Outcome: Progressing     Problem: Infection  Goal: Absence of Infection Signs and Symptoms  Outcome: Progressing

## 2024-08-01 NOTE — PLAN OF CARE
Problem: Breastfeeding  Goal: Effective Breastfeeding  Outcome: Progressing  Intervention: Promote Breast Care and Comfort  Flowsheets (Taken 7/31/2024 2145)  Breast Care: Breastfeeding: open to air  Breast Pumping: hand expression utilized  Intervention: Promote Effective Breastfeeding  Flowsheets (Taken 7/31/2024 2145)  Breastfeeding Assistance:   assisted with positioning   feeding on demand promoted   hand expression verified   infant latch-on verified   support offered   infant suck/swallow verified   feeding cue recognition promoted   infant stimulated to wakeful state  Parent-Child Attachment Promotion:   cue recognition promoted   positive reinforcement provided   skin-to-skin contact encouraged  Intervention: Support Exclusive Breastfeeding Success  Flowsheets (Taken 7/31/2024 2145)  Supportive Measures:   active listening utilized   counseling provided   positive reinforcement provided  Breastfeeding Support:   lactation counseling provided   encouragement provided

## 2024-08-02 LAB — PSYCHE PATHOLOGY RESULT: NORMAL

## 2024-08-02 PROCEDURE — 25000003 PHARM REV CODE 250

## 2024-08-02 PROCEDURE — 25000003 PHARM REV CODE 250: Performed by: OBSTETRICS & GYNECOLOGY

## 2024-08-02 PROCEDURE — 11000001 HC ACUTE MED/SURG PRIVATE ROOM

## 2024-08-02 RX ORDER — IBUPROFEN 800 MG/1
800 TABLET ORAL EVERY 8 HOURS
Qty: 30 TABLET | Refills: 1 | Status: SHIPPED | OUTPATIENT
Start: 2024-08-02

## 2024-08-02 RX ORDER — OXYCODONE AND ACETAMINOPHEN 5; 325 MG/1; MG/1
1 TABLET ORAL EVERY 4 HOURS PRN
Qty: 28 TABLET | Refills: 0 | Status: SHIPPED | OUTPATIENT
Start: 2024-08-02

## 2024-08-02 RX ORDER — BUTALBITAL, ACETAMINOPHEN AND CAFFEINE 50; 325; 40 MG/1; MG/1; MG/1
1 TABLET ORAL ONCE
Status: COMPLETED | OUTPATIENT
Start: 2024-08-02 | End: 2024-08-02

## 2024-08-02 RX ADMIN — DOCUSATE SODIUM 200 MG: 100 CAPSULE, LIQUID FILLED ORAL at 08:08

## 2024-08-02 RX ADMIN — IBUPROFEN 800 MG: 800 TABLET, FILM COATED ORAL at 06:08

## 2024-08-02 RX ADMIN — PRENATAL VITAMINS-IRON FUMARATE 27 MG IRON-FOLIC ACID 0.8 MG TABLET 1 TABLET: at 08:08

## 2024-08-02 RX ADMIN — ONDANSETRON 8 MG: 4 TABLET, ORALLY DISINTEGRATING ORAL at 10:08

## 2024-08-02 RX ADMIN — IBUPROFEN 800 MG: 800 TABLET, FILM COATED ORAL at 09:08

## 2024-08-02 RX ADMIN — DOCUSATE SODIUM 200 MG: 100 CAPSULE, LIQUID FILLED ORAL at 09:08

## 2024-08-02 RX ADMIN — IBUPROFEN 800 MG: 800 TABLET, FILM COATED ORAL at 02:08

## 2024-08-02 RX ADMIN — OXYCODONE HYDROCHLORIDE AND ACETAMINOPHEN 1 TABLET: 10; 325 TABLET ORAL at 08:08

## 2024-08-02 RX ADMIN — BUTALBITAL, ACETAMINOPHEN, AND CAFFEINE 1 TABLET: 50; 325; 40 TABLET ORAL at 10:08

## 2024-08-02 NOTE — PLAN OF CARE
Problem: Wound  Goal: Optimal Coping  Outcome: Progressing  Goal: Optimal Functional Ability  Outcome: Progressing  Goal: Absence of Infection Signs and Symptoms  Outcome: Progressing  Goal: Improved Oral Intake  Outcome: Progressing  Goal: Optimal Pain Control and Function  Outcome: Progressing  Goal: Skin Health and Integrity  Outcome: Progressing  Goal: Optimal Wound Healing  Outcome: Progressing     Problem: Breastfeeding  Goal: Effective Breastfeeding  Outcome: Progressing     Problem: Infection  Goal: Absence of Infection Signs and Symptoms  Outcome: Progressing

## 2024-08-02 NOTE — LACTATION NOTE
This note was copied from a baby's chart.  Assisted with position and latch. Mom tandem nursing babies. Baby boy nursing with shield.  Somewhat sleepy at the breast, but having lots of good swallows with breast compressions.     Discussed  the need for pumping after some feeds in the early weeks since the babies are early. Offered to assist mom with using pump. Mom experiencing a headache at this time and since both babies fed well, will assist with pumping after another feeding.     Discussed signs of adequate intake. Answered mom and dads questions. Encouraged to call for assistance as needed.  Verbalized understanding of all.  Mom has a pump for home use.

## 2024-08-02 NOTE — PROGRESS NOTES
PostPartum Progress Note        Subjective:      Post-Operative Day #2 after  delivery secondary to twin gestation, IUGR of twin A, and malposition of twins .    Patient is without complaints. Lochia less than menses. Breast feeding. Pain is well controlled. Patient is ambulating. Tolerating Full Regular diet.Overall mother and baby are doing well. Baby boy twin failed his car seat test last night. They are repeating tonight. Hoping for discharge tomorrow.    Objective:      Temp:  [97.7 °F (36.5 °C)-98.3 °F (36.8 °C)] 98.2 °F (36.8 °C)  Pulse:  [61-92] 92  Resp:  [16-18] 16  SpO2:  [96 %-99 %] 98 %  BP: ()/(60-68) 98/64  No intake or output data in the 24 hours ending 24 0823  Body mass index is 28.7 kg/m².    General: no acute distress  Abdomen: soft, non-tender, non-distended; Fundus firm and below the umbilicus  Incision- intact, healing well, no sign of infection  Extremities: non-tender, symmetric, trace edema    Group & Rh   Date Value Ref Range Status   2024 A POS  Final     Recent Results (from the past 336 hour(s))   Manual Differential    Collection Time: 24  4:18 AM   Result Value Ref Range    WBC 24.57 x10(3)/mcL   CBC with Differential    Collection Time: 24  4:18 AM   Result Value Ref Range    WBC 24.57 (H) 4.50 - 11.50 x10(3)/mcL    Hgb 7.9 (L) 12.0 - 16.0 g/dL    Hct 24.2 (L) 37.0 - 47.0 %    Platelet 251 130 - 400 x10(3)/mcL   CBC with Differential    Collection Time: 24  1:44 PM   Result Value Ref Range    WBC 16.41 (H) 4.50 - 11.50 x10(3)/mcL    Hgb 10.9 (L) 12.0 - 16.0 g/dL    Hct 32.9 (L) 37.0 - 47.0 %    Platelet 222 130 - 400 x10(3)/mcL          Assessment:     24 y.o.  S/P  Delivery Post-Operative Day #2  - Doing Well      Plan:     1. Continue routine postpartum care  2. Plan for D/C  Saturday or      3. Encouraged ambulation

## 2024-08-02 NOTE — PLAN OF CARE
"  Problem: Adult Inpatient Plan of Care  Goal: Plan of Care Review  Outcome: Progressing  Goal: Patient-Specific Goal (Individualized)  Description: "I want to breastfeed my twin babies"  Outcome: Progressing  Goal: Absence of Hospital-Acquired Illness or Injury  Outcome: Progressing  Goal: Optimal Comfort and Wellbeing  Outcome: Progressing  Goal: Readiness for Transition of Care  Outcome: Progressing     Problem:  Fall Injury Risk  Goal: Absence of Fall, Infant Drop and Related Injury  Outcome: Progressing     Problem: Wound  Goal: Optimal Coping  Outcome: Progressing  Goal: Optimal Functional Ability  Outcome: Progressing  Goal: Absence of Infection Signs and Symptoms  Outcome: Progressing  Goal: Improved Oral Intake  Outcome: Progressing  Goal: Optimal Pain Control and Function  Outcome: Progressing  Goal: Skin Health and Integrity  Outcome: Progressing  Goal: Optimal Wound Healing  Outcome: Progressing     Problem: Infection  Goal: Absence of Infection Signs and Symptoms  Outcome: Progressing     Problem: Breastfeeding  Goal: Effective Breastfeeding  Outcome: Progressing     "

## 2024-08-03 VITALS
RESPIRATION RATE: 18 BRPM | HEIGHT: 63 IN | SYSTOLIC BLOOD PRESSURE: 102 MMHG | DIASTOLIC BLOOD PRESSURE: 67 MMHG | HEART RATE: 91 BPM | WEIGHT: 162 LBS | OXYGEN SATURATION: 99 % | TEMPERATURE: 98 F | BODY MASS INDEX: 28.7 KG/M2

## 2024-08-03 PROCEDURE — 25000003 PHARM REV CODE 250: Performed by: OBSTETRICS & GYNECOLOGY

## 2024-08-03 RX ADMIN — IBUPROFEN 800 MG: 800 TABLET, FILM COATED ORAL at 06:08

## 2024-08-03 RX ADMIN — IBUPROFEN 800 MG: 800 TABLET, FILM COATED ORAL at 01:08

## 2024-08-03 RX ADMIN — DOCUSATE SODIUM 200 MG: 100 CAPSULE, LIQUID FILLED ORAL at 08:08

## 2024-08-03 RX ADMIN — OXYCODONE HYDROCHLORIDE AND ACETAMINOPHEN 1 TABLET: 10; 325 TABLET ORAL at 08:08

## 2024-08-03 RX ADMIN — PRENATAL VITAMINS-IRON FUMARATE 27 MG IRON-FOLIC ACID 0.8 MG TABLET 1 TABLET: at 08:08

## 2024-08-03 NOTE — PLAN OF CARE
Problem: Breastfeeding  Goal: Effective Breastfeeding  Intervention: Promote Effective Breastfeeding  Flowsheets (Taken 8/3/2024 0030)  Breastfeeding Assistance:   assisted with positioning   infant latch-on verified   feeding cue recognition promoted   infant stimulated to wakeful state   infant suck/swallow verified   support offered   supplemental feeding provided  Parent-Child Attachment Promotion:   positive reinforcement provided   strengths emphasized  Intervention: Support Exclusive Breastfeeding Success  Flowsheets (Taken 8/3/2024 0030)  Supportive Measures:   active listening utilized   counseling provided   decision-making supported   goal-setting facilitated   self-care encouraged   problem-solving facilitated   positive reinforcement provided   verbalization of feelings encouraged  Breastfeeding Support:   encouragement provided   diary/feeding log utilized   lactation counseling provided   maternal rest encouraged

## 2024-08-03 NOTE — DISCHARGE SUMMARY
Delivery Discharge Summary  Obstetrics      Primary OB Clinician: [facility]    Discharge Provider: Yahaira Allen MD    Admission date: 2024  Discharge date: 2024    Admit Dx:   Discharge Dx:    Patient Active Problem List   Diagnosis    Dichorionic diamniotic twin pregnancy in second trimester    Marginal insertion of umbilical cord affecting management of mother in second trimester    Poor fetal growth affecting management of mother in third trimester     delivery delivered       Procedure: , due to di/di twins, IUGR  of Twin A with elevated dopplers    Hospital Course:  Reyes Sethi is a 24 y.o. now  who was admitted on 2024 for observation due to new finding of IUGR of A with abnormal dopplers She received BMZ x2 and delivery scheduled. Patient delivered two viable newborns. Please see delivery note for further details. Pt was in stable condition post delivery and was transferred to the Mother-Baby Unit. Her postpartum course was uncomplicated. On the date of discharge, patient's pain is controlled with oral pain medications. Incision intact without signs of infection. She is tolerating ambulation without SOB or CP, and PO diet without N/V. Reported lochia is within the normal range. Pt in stable condition and ready for discharge.     Pertinent studies:  Postpartum CBC  Lab Results   Component Value Date    WBC 24.57 (H) 2024    WBC 24.57 2024    HGB 7.9 (L) 2024    HCT 24.2 (L) 2024    MCV 87.7 2024     2024          JOE Sethi Girl Reyes [[id number]]     Delivery:    Episiotomy: None   Lacerations: None   Repair suture: None   Repair # of packets:     Blood loss (ml):       Birth information:  YOB: 2025   Time of birth: 12:01 PM   Sex: female   Delivery type: , Low Transverse   Gestational Age: 35w5d     Measurements    Weight: 2290 g  Weight (lbs): 5 lb 0.8 oz  Length: 45.7 cm  Length (in):  "18"  Head circumference: 31.8 cm         Delivery Clinician: Delivery Providers    Delivering clinician: Aparna Recio MD   Provider Role    Caesar Sethi, RN Delivery Nurse             Additional  information:  Forceps:    Vacuum:    Breech:    Observed anomalies      Living?:     Apgars    Living status: Living  Apgar Component Scores:  1 min.:  5 min.:  10 min.:  15 min.:  20 min.:    Skin color:  0  1       Heart rate:  2  2       Reflex irritability:  2  2       Muscle tone:  2  2       Respiratory effort:  2  2       Total:  8  9       Apgars assigned by: IVORY RENDON RN/NICU         Placenta: Delivered:       appearance     [email or web address] [[id number]]     Delivery:    Episiotomy: None   Lacerations: None   Repair suture: None   Repair # of packets:     Blood loss (ml):       Birth information:  YOB: 2025   Time of birth: 12:03 PM   Sex: male   Delivery type: , Low Transverse   Gestational Age: 35w5d     Measurements    Weight: 2360 g  Weight (lbs): 5 lb 3.3 oz  Length: 45.7 cm  Length (in): 18"  Head circumference: 32 cm         Delivery Clinician: Delivery Providers    Delivering clinician: Aparna Recio MD   Provider Role    Caesar Sethi, RN Delivery Nurse             Additional  information:  Forceps:    Vacuum:    Breech:    Observed anomalies      Living?:     Apgars    Living status: Living  Apgar Component Scores:  1 min.:  5 min.:  10 min.:  15 min.:  20 min.:    Skin color:  0  1       Heart rate:  2  2       Reflex irritability:  2  2       Muscle tone:  2  2       Respiratory effort:  2  2       Total:  8  9       Apgars assigned by: IVORY TAYLOR         Placenta: Delivered:       appearance    Disposition: To home, self care    Follow Up: 2 weeks    Patient Instructions:   1. Call the office for any bleeding >2 pads/hour for >2 hours, temperature >100.4, pain that is uncontrolled with medications, or for any other concerns.  2. Pelvic rest and no tub " baths x 6 weeks.  3. No driving while on narcotics.    Current Discharge Medication List        START taking these medications    Details   ibuprofen (ADVIL,MOTRIN) 800 MG tablet Take 1 tablet (800 mg total) by mouth every 8 (eight) hours.  Qty: 30 tablet, Refills: 1      oxyCODONE-acetaminophen (PERCOCET) 5-325 mg per tablet Take 1 tablet by mouth every 4 (four) hours as needed for Pain.  Qty: 28 tablet, Refills: 0    Comments: Quantity prescribed more than 7 day supply? No           CONTINUE these medications which have NOT CHANGED    Details   aspirin (ECOTRIN) 81 MG EC tablet Take 81 mg by mouth once daily.      cetirizine (ZYRTEC) 10 MG tablet Take 10 mg by mouth once daily.      VITAFOL FE PLUS 90 mg iron- 1 mg-200 mg Cap Take 1 capsule by mouth once daily.             Yahaira Allen

## 2024-08-03 NOTE — PLAN OF CARE
"  Problem: Adult Inpatient Plan of Care  Goal: Plan of Care Review  Outcome: Met  Goal: Patient-Specific Goal (Individualized)  Description: "I want to breastfeed my twin babies"  Outcome: Met  Goal: Absence of Hospital-Acquired Illness or Injury  Outcome: Met  Goal: Optimal Comfort and Wellbeing  Outcome: Met  Goal: Readiness for Transition of Care  Outcome: Met     Problem:  Fall Injury Risk  Goal: Absence of Fall, Infant Drop and Related Injury  Outcome: Met     Problem: Wound  Goal: Optimal Coping  Outcome: Met  Goal: Optimal Functional Ability  Outcome: Met  Goal: Absence of Infection Signs and Symptoms  Outcome: Met  Goal: Improved Oral Intake  Outcome: Met  Goal: Optimal Pain Control and Function  Outcome: Met  Goal: Skin Health and Integrity  Outcome: Met  Goal: Optimal Wound Healing  Outcome: Met     Problem: Infection  Goal: Absence of Infection Signs and Symptoms  Outcome: Met     Problem: Breastfeeding  Goal: Effective Breastfeeding  Outcome: Met     "

## 2024-08-05 ENCOUNTER — PATIENT MESSAGE (OUTPATIENT)
Dept: ADMINISTRATIVE | Facility: OTHER | Age: 24
End: 2024-08-05
Payer: COMMERCIAL

## 2024-08-05 ENCOUNTER — ANESTHESIA (OUTPATIENT)
Dept: OBSTETRICS AND GYNECOLOGY | Facility: HOSPITAL | Age: 24
End: 2024-08-05
Payer: COMMERCIAL

## 2024-08-06 ENCOUNTER — PATIENT MESSAGE (OUTPATIENT)
Dept: ADMINISTRATIVE | Facility: OTHER | Age: 24
End: 2024-08-06
Payer: COMMERCIAL

## (undated) DEVICE — BULB SYRINGE EAR IRRIGATION

## (undated) DEVICE — SEE MEDLINE ITEM 156931

## (undated) DEVICE — MATTRESS HOVERMAT TRNSF 34X78

## (undated) DEVICE — SEE MEDLINE ITEM 157117

## (undated) DEVICE — ELECTRODE REM PLYHSV RETURN 9

## (undated) DEVICE — CAP BABY BEANIE

## (undated) DEVICE — BINDER ABDOM 4PANEL 12IN LG/XL

## (undated) DEVICE — PAD SANITARY OB STERILE

## (undated) DEVICE — SUT CHROMIC GUT 2-0 CT-1 27IN

## (undated) DEVICE — SOL WATER STRL IRR 1000ML

## (undated) DEVICE — Device

## (undated) DEVICE — PAD UNDERPAD 30X30

## (undated) DEVICE — SUT MONOCRYL 4-0 PS-1 UND

## (undated) DEVICE — SUT VICRYL 2-0 36 CT-1

## (undated) DEVICE — SET FLUID WARMER RANGER

## (undated) DEVICE — TRAY CATH FOL SIL URIMTR 16FR

## (undated) DEVICE — SOL NACL IRR 1000ML BTL

## (undated) DEVICE — SUT 2/0 27IN PLAIN GUT CT